# Patient Record
Sex: MALE | Race: WHITE | Employment: OTHER | URBAN - METROPOLITAN AREA
[De-identification: names, ages, dates, MRNs, and addresses within clinical notes are randomized per-mention and may not be internally consistent; named-entity substitution may affect disease eponyms.]

---

## 2021-09-25 ENCOUNTER — HOSPITAL ENCOUNTER (EMERGENCY)
Facility: CLINIC | Age: 61
Discharge: HOME | End: 2021-09-25
Attending: EMERGENCY MEDICINE | Admitting: EMERGENCY MEDICINE
Payer: COMMERCIAL

## 2021-09-25 VITALS
RESPIRATION RATE: 15 BRPM | HEIGHT: 67 IN | WEIGHT: 220 LBS | BODY MASS INDEX: 34.53 KG/M2 | TEMPERATURE: 98.6 F | OXYGEN SATURATION: 95 % | DIASTOLIC BLOOD PRESSURE: 87 MMHG | SYSTOLIC BLOOD PRESSURE: 149 MMHG | HEART RATE: 75 BPM

## 2021-09-25 DIAGNOSIS — Z91.038 HYMENOPTERA ALLERGY: ICD-10-CM

## 2021-09-25 LAB
ANION GAP SERPL CALCULATED.3IONS-SCNC: 7 MMOL/L (ref 3–14)
BASOPHILS # BLD AUTO: 0.1 10E3/UL (ref 0–0.2)
BASOPHILS NFR BLD AUTO: 1 %
BUN SERPL-MCNC: 15 MG/DL (ref 7–30)
CALCIUM SERPL-MCNC: 8.4 MG/DL (ref 8.5–10.1)
CHLORIDE BLD-SCNC: 108 MMOL/L (ref 94–109)
CO2 SERPL-SCNC: 26 MMOL/L (ref 20–32)
CREAT SERPL-MCNC: 1.17 MG/DL (ref 0.66–1.25)
EOSINOPHIL # BLD AUTO: 0.1 10E3/UL (ref 0–0.7)
EOSINOPHIL NFR BLD AUTO: 1 %
ERYTHROCYTE [DISTWIDTH] IN BLOOD BY AUTOMATED COUNT: 12.7 % (ref 10–15)
GFR SERPL CREATININE-BSD FRML MDRD: 67 ML/MIN/1.73M2
GLUCOSE BLD-MCNC: 105 MG/DL (ref 70–99)
HCT VFR BLD AUTO: 44.1 % (ref 40–53)
HGB BLD-MCNC: 14.9 G/DL (ref 13.3–17.7)
IMM GRANULOCYTES # BLD: 0.1 10E3/UL
IMM GRANULOCYTES NFR BLD: 1 %
LYMPHOCYTES # BLD AUTO: 2 10E3/UL (ref 0.8–5.3)
LYMPHOCYTES NFR BLD AUTO: 27 %
MCH RBC QN AUTO: 32 PG (ref 26.5–33)
MCHC RBC AUTO-ENTMCNC: 33.8 G/DL (ref 31.5–36.5)
MCV RBC AUTO: 95 FL (ref 78–100)
MONOCYTES # BLD AUTO: 0.9 10E3/UL (ref 0–1.3)
MONOCYTES NFR BLD AUTO: 12 %
NEUTROPHILS # BLD AUTO: 4.3 10E3/UL (ref 1.6–8.3)
NEUTROPHILS NFR BLD AUTO: 58 %
NRBC # BLD AUTO: 0 10E3/UL
NRBC BLD AUTO-RTO: 0 /100
PLATELET # BLD AUTO: 195 10E3/UL (ref 150–450)
POTASSIUM BLD-SCNC: 3.7 MMOL/L (ref 3.4–5.3)
RBC # BLD AUTO: 4.65 10E6/UL (ref 4.4–5.9)
SODIUM SERPL-SCNC: 141 MMOL/L (ref 133–144)
TROPONIN I SERPL-MCNC: <0.015 UG/L (ref 0–0.04)
TROPONIN I SERPL-MCNC: <0.015 UG/L (ref 0–0.04)
WBC # BLD AUTO: 7.4 10E3/UL (ref 4–11)

## 2021-09-25 PROCEDURE — 96374 THER/PROPH/DIAG INJ IV PUSH: CPT

## 2021-09-25 PROCEDURE — 250N000009 HC RX 250: Performed by: EMERGENCY MEDICINE

## 2021-09-25 PROCEDURE — 93005 ELECTROCARDIOGRAM TRACING: CPT

## 2021-09-25 PROCEDURE — 80048 BASIC METABOLIC PNL TOTAL CA: CPT | Performed by: EMERGENCY MEDICINE

## 2021-09-25 PROCEDURE — 258N000003 HC RX IP 258 OP 636: Performed by: EMERGENCY MEDICINE

## 2021-09-25 PROCEDURE — 36415 COLL VENOUS BLD VENIPUNCTURE: CPT | Performed by: EMERGENCY MEDICINE

## 2021-09-25 PROCEDURE — 84484 ASSAY OF TROPONIN QUANT: CPT | Performed by: EMERGENCY MEDICINE

## 2021-09-25 PROCEDURE — 84484 ASSAY OF TROPONIN QUANT: CPT | Mod: 91 | Performed by: EMERGENCY MEDICINE

## 2021-09-25 PROCEDURE — 96361 HYDRATE IV INFUSION ADD-ON: CPT

## 2021-09-25 PROCEDURE — 250N000012 HC RX MED GY IP 250 OP 636 PS 637: Performed by: EMERGENCY MEDICINE

## 2021-09-25 PROCEDURE — 250N000013 HC RX MED GY IP 250 OP 250 PS 637: Performed by: EMERGENCY MEDICINE

## 2021-09-25 PROCEDURE — 85025 COMPLETE CBC W/AUTO DIFF WBC: CPT | Performed by: EMERGENCY MEDICINE

## 2021-09-25 PROCEDURE — 99284 EMERGENCY DEPT VISIT MOD MDM: CPT | Mod: 25

## 2021-09-25 RX ORDER — LIDOCAINE HYDROCHLORIDE AND EPINEPHRINE 10; 10 MG/ML; UG/ML
5 INJECTION, SOLUTION INFILTRATION; PERINEURAL ONCE
Status: DISCONTINUED | OUTPATIENT
Start: 2021-09-25 | End: 2021-09-25

## 2021-09-25 RX ORDER — DIPHENHYDRAMINE HCL 25 MG
50 CAPSULE ORAL ONCE
Status: COMPLETED | OUTPATIENT
Start: 2021-09-25 | End: 2021-09-25

## 2021-09-25 RX ORDER — EPINEPHRINE 0.3 MG/.3ML
0.3 INJECTION SUBCUTANEOUS
Qty: 2 EACH | Refills: 0 | Status: SHIPPED | OUTPATIENT
Start: 2021-09-25

## 2021-09-25 RX ORDER — PREDNISONE 20 MG/1
40 TABLET ORAL ONCE
Status: COMPLETED | OUTPATIENT
Start: 2021-09-25 | End: 2021-09-25

## 2021-09-25 RX ORDER — PREDNISONE 20 MG/1
40 TABLET ORAL DAILY
Qty: 4 TABLET | Refills: 0 | Status: SHIPPED | OUTPATIENT
Start: 2021-09-25 | End: 2021-09-27

## 2021-09-25 RX ADMIN — FAMOTIDINE 20 MG: 10 INJECTION, SOLUTION INTRAVENOUS at 16:39

## 2021-09-25 RX ADMIN — PREDNISONE 40 MG: 20 TABLET ORAL at 16:41

## 2021-09-25 RX ADMIN — DIPHENHYDRAMINE HYDROCHLORIDE 50 MG: 25 CAPSULE ORAL at 16:40

## 2021-09-25 RX ADMIN — SODIUM CHLORIDE 1000 ML: 9 INJECTION, SOLUTION INTRAVENOUS at 16:38

## 2021-09-25 ASSESSMENT — MIFFLIN-ST. JEOR: SCORE: 1761.54

## 2021-09-25 ASSESSMENT — ENCOUNTER SYMPTOMS
TROUBLE SWALLOWING: 1
SHORTNESS OF BREATH: 0
CHEST TIGHTNESS: 1

## 2021-09-25 NOTE — ED TRIAGE NOTES
Pt presents after getting stung by bee around 1515 this afternoon in the left upper arm. Pt developed weakness and feeling fatigued. Known allergy to bees, gave self epi pen to left thigh prior to EMS arrival. On arrival to ED, pt denies any SOB, difficulty swallowing or breathing, itching, hives, or any other sx. ABCs intact.

## 2021-09-25 NOTE — ED PROVIDER NOTES
"  History   Chief Complaint:  Allergic Reaction    The history is provided by the patient.      Vance Louis is a 60 year old male with history of bee venom allergy who presents with an allergic reaction. Vance was working and noticed a welt on his left upper arm that he feels was likely a bee sting 1-2 hours prior to arrival. The area increased in size \"like a hive\" and he started to feel \"foggy\". There was pain in his arm and he felt fatigued. He denies face, lip, or tongue swelling and had no difficulty breathing but felt swallowing water was more difficult that usual. Because he has had allergic reactions to bee stings in the past, he became concerned and used his EpiPen, prompting call to EMS. By the time of arrival, he feels the welt and the fogginess have improved but is feeling tightness across his chest. He denies itching or rash.    Review of Systems   Constitutional: Positive for fatigue. Negative for activity change.   HENT: Positive for trouble swallowing. Negative for facial swelling.    Respiratory: Positive for chest tightness. Negative for shortness of breath.    Skin: Negative for rash and wound (welt on left upper arm).   All other systems reviewed and are negative.    Allergies:  Bee venom  Iodinated diagnostic agents    Medications:  Reviewed. Denies routine medications.     Past Medical History:    Shingles  Obesity  Plantar fascial fibromatosis  Bradycardia    Past Surgical History:    Arthroscopy - Both knees, Left elbow  Colonoscopy  Partial medial menisectomy    Family History:    Diverticulitis: Brother  Lymphoma: Father  ALS: Mother    Social History:  PCP: [facility]  Presents to the ED with alone via EMS. Wife later at bedside.     Physical Exam     Patient Vitals for the past 24 hrs:   BP Temp Temp src Pulse Resp SpO2 Height Weight   09/25/21 2000 (!) 149/87 -- -- 75 15 95 % -- --   09/25/21 1930 (!) 140/81 -- -- 68 19 94 % -- --   09/25/21 1900 (!) 140/87 -- -- 62 13 94 % -- -- " "  09/25/21 1830 (!) 141/81 -- -- 64 25 96 % -- --   09/25/21 1730 137/79 -- -- 73 19 96 % -- --   09/25/21 1700 126/83 -- -- 75 14 93 % -- --   09/25/21 1600 126/74 -- -- 71 13 97 % -- --   09/25/21 1549 (!) 143/80 98.6  F (37  C) Oral 72 18 96 % 1.702 m (5' 7\") 99.8 kg (220 lb)       Physical Exam  General: Well-developed and well-nourished. Well appearing middle aged  man. Cooperative.  Head:  Atraumatic.  Eyes:  Conjunctivae, lids, and sclerae are normal.  ENT:    Normal nose. Moist mucous membranes.  No objective lip, tongue, or facial edema.  No posterior oropharyngeal edema.  Neck:  Supple. Normal range of motion.  CV:  Regular rate and rhythm. Normal heart sounds with no murmurs, rubs, or gallops detected.  Resp:  No respiratory distress. Clear to auscultation bilaterally without decreased breath sounds, wheezing, rales, or rhonchi.  GI:  Soft. Non-distended. Non-tender.    MS:  Normal ROM. No bilateral lower extremity edema.  Skin:  Warm. Non-diaphoretic. No pallor.  No urticaria.  Localized erythematous papule on the anterolateral left upper arm consistent with insect sting.  Neuro:  Awake. A&Ox3. Normal strength.  Psych: Normal mood and affect. Normal speech.  Vitals reviewed.      Emergency Department Course   EKG  Indication: Normal sinus rhythm  Normal EKG  Time: 1626  Rate 66 bpm. VA interval 140. QRS duration 88. QT/QTc 414/434.    No acute ST changes.  No prior EKG for comparison.     Laboratory:  CBC: WBC 7.4, HGB 14.9,    BMP: Glucose 105 (H), Calcium 8.4 (L), o/w WNL (Creatinine: 1.17)    Troponin (Collected 1636): <0.015  Troponin (Collected 1835): <0.015    Emergency Department Course:  Reviewed:  I reviewed the patient's nursing notes, vitals, past medical records, and Care Everywhere.     Assessments:  1603 I performed an assessment and examination of the patient as noted above.     1725 I rechecked and updated the patient on findings. He feels " "improved.    Interventions:  1638 0.9% sodium chloride bolus, 1L, IV   1639 Pepcid, 20 mg,  IV  1640 Benadryl, 50 mg, PO  1641 Deltasone, 40 mg, PO    Disposition:  The patient was discharged home.     Impression & Plan   Medical Decision Making:  Vance is a 60 year old man who noticed a spot on his left upper arm consistent with insect sting.  He noticed this area getting bigger and \"felt foggy\".  He was concerned this represented an allergic reaction as he has had anaphylaxis in the past.  He gave himself his EpiPen and called paramedics who transported him to the emergency department.  By the time of arrival he is overall feeling improved although he does have a tightness across his chest.  He has no difficulty breathing or swallowing and no objective lip, face, or tongue edema.  His lungs are clear.  There is no urticaria.  EKG is reassuring without acute ST changes or arrhythmias.  I did both an initial troponin and delta troponin given his chest tightness after epinephrine.  Fortunately, both of these are undetectable.  His chest tightness actually improved in the emergency department without further intervention.  Basic laboratory studies are noncontributory.  He was given IV fluids in addition to Pepcid, Benadryl, and prednisone.  He was monitored for several hours and had no recurrence or worsening of symptoms and, indeed, feels improved.  As such, he is appropriate for discharge.  I will give him 2 more days of prednisone and I recommended he take this with Pepcid.  I have also discussed indications for Benadryl use at home and I will refill his EpiPen.  We discussed indications for use of EpiPen and to return to the emergency department. I answered all his and his wife's questions.  They verbalized understanding and are amenable to discharge.    Covid-19  Vance Louis was evaluated during a global COVID-19 pandemic, which necessitated consideration that the patient might be at risk for infection with the " SARS-CoV-2 virus that causes COVID-19.   Applicable protocols for evaluation were followed during the patient's care.   COVID-19 was considered as part of the patient's evaluation.     Diagnosis:    ICD-10-CM    1. Hymenoptera allergy  Z91.030        Discharge Medications:  New Prescriptions    EPINEPHRINE (ANY BX GENERIC EQUIV) 0.3 MG/0.3ML INJECTION 2-PACK    Inject 0.3 mLs (0.3 mg) into the muscle once as needed for anaphylaxis    PREDNISONE (DELTASONE) 20 MG TABLET    Take 2 tablets (40 mg) by mouth daily for 2 days       Scribe Disclosure:  Garrett GRAY, am serving as a scribe at 4:03 PM on 9/25/2021 to document services personally performed by Juana Anthony MD based on my observations and the provider's statements to me.       Juana Anthony MD  09/26/21 1834

## 2021-09-25 NOTE — ED NOTES
Bed: ED20  Expected date:   Expected time:   Means of arrival:   Comments:  A594, 60M, Allergic reaction

## 2021-09-26 ASSESSMENT — ENCOUNTER SYMPTOMS
FATIGUE: 1
FACIAL SWELLING: 0
ACTIVITY CHANGE: 0
WOUND: 0

## 2021-09-26 NOTE — DISCHARGE INSTRUCTIONS
Steroids as directed for the next 2 days.  You should also take this with Pepcid.    Benadryl 25 to 50 mg every 6 hours as needed for symptoms.  I would take this at least 1 more time before you go to bed tonight.  If you have any concerning symptoms including, but not limited to, lip or tongue swelling, difficulty breathing, or difficulty swallowing, you should use your EpiPen or return to the nearest emergency department.  Follow-up with primary care.

## 2021-09-27 ENCOUNTER — NURSE TRIAGE (OUTPATIENT)
Dept: NURSING | Facility: CLINIC | Age: 61
End: 2021-09-27

## 2021-09-27 LAB
ATRIAL RATE - MUSE: 66 BPM
DIASTOLIC BLOOD PRESSURE - MUSE: NORMAL MMHG
INTERPRETATION ECG - MUSE: NORMAL
P AXIS - MUSE: 38 DEGREES
PR INTERVAL - MUSE: 140 MS
QRS DURATION - MUSE: 88 MS
QT - MUSE: 414 MS
QTC - MUSE: 434 MS
R AXIS - MUSE: 20 DEGREES
SYSTOLIC BLOOD PRESSURE - MUSE: NORMAL MMHG
T AXIS - MUSE: 27 DEGREES
VENTRICULAR RATE- MUSE: 66 BPM

## 2021-09-27 NOTE — TELEPHONE ENCOUNTER
"Patient calling to follow-up after bee-sting on 9-25-21.  He was treated in ED after using his epi pen.  Today the sting site is \"red and itchy\".  Denies difficulty breathing, hives or red streak at site.  He took a dose of benadyl early this morning and is due to take lost dose of prednisone.  Patient was instructed on red flag symptoms.  Disposition is home care.  Patient verbalized understanding and agrees with plan.     Isha Salcido RN  [facility] Nurse Advisor  11:49 AM 9/27/2021    Reason for Disposition    Normal local reaction to bee, wasp, or yellow jacket sting    Additional Information    Negative: Passed out (i.e., fainted, collapsed and was not responding)    Negative: Wheezing or difficulty breathing    Negative: Hoarseness, cough, or tightness in the throat or chest    Negative: Swollen tongue or difficulty swallowing    Negative: Life-threatening reaction in past to sting (anaphylaxis) and < 2 hours since sting    Negative: Sounds like a life-threatening emergency to the triager    Negative: Not a bee, wasp, hornet, or yellow jacket sting    Negative: Hives, itching, or swelling elsewhere on body (i.e., not at a site of sting) and started within 2 hours of sting    Negative: Vomiting or abdominal cramps and started within 2 hours of sting    Negative: Gave epinephrine shot and no symptoms now    Negative: Patient sounds very sick or weak to the triager    Negative: Sting inside the mouth    Negative: Sting on eyeball (e.g., cornea)    Negative: More than 50 stings    Negative: Fever and area is red    Negative: Fever and area is very tender to touch    Negative: Red streak or red line and length > 2 inches (5 cm)    Negative: Red or very tender (to touch) area, and started over 24 hours after the sting    Negative: Red or very tender (to touch) area, getting larger over 48 hours after the sting    Negative: Swelling is huge (e.g., > 4 inches or 10 cm, spreads beyond wrist or ankle)    " Negative: Patient wants to be seen    Negative: Hives, itching, or swelling elsewhere on body (i.e., not at a site of sting) and started > 2 hours after sting    Negative: Scab drains pus or increases in size, and not improved after applying antibiotic ointment for 2 days    Protocols used: BEE OR YELLOW JACKET STING-A-[place]    COVID 19 Nurse Triage Plan/Patient Instructions    Please be aware that novel coronavirus (COVID-19) may be circulating in the community. If you develop symptoms such as fever, cough, or SOB or if you have concerns about the presence of another infection including coronavirus (COVID-19), please contact your health care provider or visit [email or web address].     Disposition/Instructions    Home care recommended. Follow home care protocol based instructions.    Thank you for taking steps to prevent the spread of this virus.  o Limit your contact with others.  o Wear a simple mask to cover your cough.  o Wash your hands well and often.    Resources    [facility]: About COVID-19: [email or web address]    CDC: What to Do If You're Sick: www.cdc.gov/coronavirus/2019-ncov/about/steps-when-sick.html    CDC: Ending Home Isolation: www.cdc.gov/coronavirus/2019-ncov/hcp/disposition-in-home-patients.html     CDC: Caring for Someone: www.cdc.gov/coronavirus/2019-ncov/if-you-are-sick/care-for-someone.html     [facility]: Interim Guidance for Hospital Discharge to Home: [email or web address]/coronavirus/hcp/hospdischarge.pdf    [facility] clinical trials (COVID-19 research studies): [email or web address]     Below are the COVID-19 hotlines at the [place] [facility] ([facility]). Interpreters are available.   o For health questions: Call 270-956-5407 or 1-959.438.7638 (7 a.m. to 7 p.m.)  o For questions about schools and childcare: Call 103-283-6081 or 1-895.272.6870 (7 a.m. to 7 p.m.)